# Patient Record
Sex: MALE | Race: WHITE | NOT HISPANIC OR LATINO | Employment: FULL TIME | URBAN - METROPOLITAN AREA
[De-identification: names, ages, dates, MRNs, and addresses within clinical notes are randomized per-mention and may not be internally consistent; named-entity substitution may affect disease eponyms.]

---

## 2023-10-05 PROBLEM — E87.6 HYPOKALEMIA DUE TO EXCESSIVE GASTROINTESTINAL LOSS OF POTASSIUM: Status: ACTIVE | Noted: 2023-10-05

## 2023-10-05 PROBLEM — K56.609 SBO (SMALL BOWEL OBSTRUCTION): Status: ACTIVE | Noted: 2023-10-05

## 2023-10-05 PROBLEM — K21.9 GASTROESOPHAGEAL REFLUX DISEASE WITHOUT ESOPHAGITIS: Chronic | Status: ACTIVE | Noted: 2023-10-05

## 2023-10-05 PROBLEM — I10 PRIMARY HYPERTENSION: Chronic | Status: ACTIVE | Noted: 2023-10-05

## 2023-10-08 PROBLEM — E87.6 HYPOKALEMIA DUE TO EXCESSIVE GASTROINTESTINAL LOSS OF POTASSIUM: Status: RESOLVED | Noted: 2023-10-05 | Resolved: 2023-10-08

## 2024-03-21 ENCOUNTER — HOSPITAL ENCOUNTER (EMERGENCY)
Facility: HOSPITAL | Age: 49
Discharge: HOME | End: 2024-03-21
Attending: STUDENT IN AN ORGANIZED HEALTH CARE EDUCATION/TRAINING PROGRAM

## 2024-03-21 VITALS
DIASTOLIC BLOOD PRESSURE: 74 MMHG | HEIGHT: 69 IN | WEIGHT: 206.56 LBS | HEART RATE: 60 BPM | TEMPERATURE: 98 F | OXYGEN SATURATION: 98 % | BODY MASS INDEX: 30.59 KG/M2 | SYSTOLIC BLOOD PRESSURE: 115 MMHG | RESPIRATION RATE: 18 BRPM

## 2024-03-21 DIAGNOSIS — R07.9 ACUTE CHEST PAIN: Primary | ICD-10-CM

## 2024-03-21 LAB
ALBUMIN SERPL BCP-MCNC: 3.7 G/DL (ref 3.5–5.2)
ALP SERPL-CCNC: 61 U/L (ref 55–135)
ALT SERPL W/O P-5'-P-CCNC: 35 U/L (ref 10–44)
ANION GAP SERPL CALC-SCNC: 9 MMOL/L (ref 8–16)
AST SERPL-CCNC: 28 U/L (ref 10–40)
BASOPHILS # BLD AUTO: 0.04 K/UL (ref 0–0.2)
BASOPHILS NFR BLD: 0.5 % (ref 0–1.9)
BILIRUB SERPL-MCNC: 0.3 MG/DL (ref 0.1–1)
BNP SERPL-MCNC: 10 PG/ML (ref 0–99)
BUN SERPL-MCNC: 15 MG/DL (ref 6–20)
CALCIUM SERPL-MCNC: 9.7 MG/DL (ref 8.7–10.5)
CHLORIDE SERPL-SCNC: 103 MMOL/L (ref 95–110)
CO2 SERPL-SCNC: 25 MMOL/L (ref 23–29)
CREAT SERPL-MCNC: 1 MG/DL (ref 0.5–1.4)
D DIMER PPP IA.FEU-MCNC: 0.23 MG/L FEU
DIFFERENTIAL METHOD BLD: NORMAL
EOSINOPHIL # BLD AUTO: 0.1 K/UL (ref 0–0.5)
EOSINOPHIL NFR BLD: 1.6 % (ref 0–8)
ERYTHROCYTE [DISTWIDTH] IN BLOOD BY AUTOMATED COUNT: 12.9 % (ref 11.5–14.5)
EST. GFR  (NO RACE VARIABLE): >60 ML/MIN/1.73 M^2
GLUCOSE SERPL-MCNC: 105 MG/DL (ref 70–110)
HCT VFR BLD AUTO: 43.8 % (ref 40–54)
HGB BLD-MCNC: 15.1 G/DL (ref 14–18)
IMM GRANULOCYTES # BLD AUTO: 0.02 K/UL (ref 0–0.04)
IMM GRANULOCYTES NFR BLD AUTO: 0.2 % (ref 0–0.5)
LYMPHOCYTES # BLD AUTO: 3.8 K/UL (ref 1–4.8)
LYMPHOCYTES NFR BLD: 43.9 % (ref 18–48)
MCH RBC QN AUTO: 29 PG (ref 27–31)
MCHC RBC AUTO-ENTMCNC: 34.5 G/DL (ref 32–36)
MCV RBC AUTO: 84 FL (ref 82–98)
MONOCYTES # BLD AUTO: 0.6 K/UL (ref 0.3–1)
MONOCYTES NFR BLD: 7.1 % (ref 4–15)
NEUTROPHILS # BLD AUTO: 4 K/UL (ref 1.8–7.7)
NEUTROPHILS NFR BLD: 46.7 % (ref 38–73)
NRBC BLD-RTO: 0 /100 WBC
PLATELET # BLD AUTO: 259 K/UL (ref 150–450)
PMV BLD AUTO: 9.8 FL (ref 9.2–12.9)
POTASSIUM SERPL-SCNC: 4.3 MMOL/L (ref 3.5–5.1)
PROT SERPL-MCNC: 7.2 G/DL (ref 6–8.4)
RBC # BLD AUTO: 5.2 M/UL (ref 4.6–6.2)
SODIUM SERPL-SCNC: 137 MMOL/L (ref 136–145)
TROPONIN I SERPL DL<=0.01 NG/ML-MCNC: 0.01 NG/ML (ref 0–0.03)
TROPONIN I SERPL DL<=0.01 NG/ML-MCNC: <0.006 NG/ML (ref 0–0.03)
WBC # BLD AUTO: 8.65 K/UL (ref 3.9–12.7)

## 2024-03-21 PROCEDURE — 84484 ASSAY OF TROPONIN QUANT: CPT | Performed by: EMERGENCY MEDICINE

## 2024-03-21 PROCEDURE — 99285 EMERGENCY DEPT VISIT HI MDM: CPT | Mod: 25

## 2024-03-21 PROCEDURE — 99900035 HC TECH TIME PER 15 MIN (STAT)

## 2024-03-21 PROCEDURE — 93005 ELECTROCARDIOGRAM TRACING: CPT

## 2024-03-21 PROCEDURE — 85379 FIBRIN DEGRADATION QUANT: CPT | Performed by: STUDENT IN AN ORGANIZED HEALTH CARE EDUCATION/TRAINING PROGRAM

## 2024-03-21 PROCEDURE — 80053 COMPREHEN METABOLIC PANEL: CPT | Performed by: STUDENT IN AN ORGANIZED HEALTH CARE EDUCATION/TRAINING PROGRAM

## 2024-03-21 PROCEDURE — 25500020 PHARM REV CODE 255: Performed by: STUDENT IN AN ORGANIZED HEALTH CARE EDUCATION/TRAINING PROGRAM

## 2024-03-21 PROCEDURE — 93010 ELECTROCARDIOGRAM REPORT: CPT | Mod: ,,, | Performed by: INTERNAL MEDICINE

## 2024-03-21 PROCEDURE — 25000003 PHARM REV CODE 250: Performed by: STUDENT IN AN ORGANIZED HEALTH CARE EDUCATION/TRAINING PROGRAM

## 2024-03-21 PROCEDURE — 85025 COMPLETE CBC W/AUTO DIFF WBC: CPT | Performed by: STUDENT IN AN ORGANIZED HEALTH CARE EDUCATION/TRAINING PROGRAM

## 2024-03-21 PROCEDURE — 84484 ASSAY OF TROPONIN QUANT: CPT | Mod: 91 | Performed by: STUDENT IN AN ORGANIZED HEALTH CARE EDUCATION/TRAINING PROGRAM

## 2024-03-21 PROCEDURE — 83880 ASSAY OF NATRIURETIC PEPTIDE: CPT | Performed by: STUDENT IN AN ORGANIZED HEALTH CARE EDUCATION/TRAINING PROGRAM

## 2024-03-21 RX ORDER — ASPIRIN 325 MG
325 TABLET ORAL
Status: COMPLETED | OUTPATIENT
Start: 2024-03-21 | End: 2024-03-21

## 2024-03-21 RX ADMIN — IOHEXOL 100 ML: 350 INJECTION, SOLUTION INTRAVENOUS at 09:03

## 2024-03-21 RX ADMIN — ASPIRIN 325 MG ORAL TABLET 325 MG: 325 PILL ORAL at 05:03

## 2024-03-21 NOTE — ED NOTES
Pt to Ed for c/o chestpain for the past month. States he couldn't tolerate it anymore. States he does have a appt with his cardiologist in April.

## 2024-03-21 NOTE — ED PROVIDER NOTES
Encounter Date: 3/21/2024    This document was partially completed using speech recognition software and may contain misspellings, grammatical errors, and/or unexpected word substitutions.       History     Chief Complaint   Patient presents with    Chest Pain     Pt arrives to the ed with c/o left anterior chest pains radiates into back x 1 month that is worsening.      48-year-old male with a history of high cholesterol, hypertension, GERD presents to the emergency department left-sided sharp chest pain for the past month.  It is described as a constant pain, worse with movement and raising his right arm.  Not better with anything in particular.  Radiates to the back.  He has been seen by [facility] Dr. Escalante for routine cardiac evaluation. He has no history of stents, heart attacks.  Mother has a history of heart failure. Sees cardiology in April. Doesn't smoke.        Review of patient's allergies indicates:  No Known Allergies  Past Medical History:   Diagnosis Date    Essential (primary) hypertension     GERD (gastroesophageal reflux disease)     Hypercholesteremia      Past Surgical History:   Procedure Laterality Date    CHOLECYSTECTOMY      HERNIA REPAIR      SINUS SURGERY      uvula removal      VASECTOMY       No family history on file.     Review of Systems   Constitutional:  Negative for chills and fever.   HENT:  Negative for congestion, rhinorrhea and sneezing.    Eyes:  Negative for discharge and redness.   Respiratory:  Negative for cough and shortness of breath.    Cardiovascular:  Positive for chest pain. Negative for palpitations.   Gastrointestinal:  Negative for abdominal pain, diarrhea and vomiting.   Genitourinary:  Negative for difficulty urinating, flank pain and urgency.   Musculoskeletal:  Negative for back pain and neck pain.   Skin:  Negative for rash and wound.   Neurological:  Negative for weakness, numbness and headaches.       Physical Exam     Initial Vitals [03/21/24 1609]   BP Pulse  Resp Temp SpO2   139/86 69 20 98.8 °F (37.1 °C) --      MAP       --         Physical Exam    Nursing note and vitals reviewed.  Constitutional: He appears well-developed. He is not diaphoretic. No distress.   HENT:   Head: Normocephalic and atraumatic.   Right Ear: External ear normal.   Left Ear: External ear normal.   Nose: Nose normal.   Eyes: Conjunctivae are normal. Right eye exhibits no discharge. Left eye exhibits no discharge. No scleral icterus.   Cardiovascular:  Normal rate and regular rhythm.           Pulmonary/Chest: Breath sounds normal. No stridor. No respiratory distress. He has no wheezes. He has no rhonchi. He has no rales. He exhibits no tenderness.   Abdominal: Abdomen is soft. He exhibits no distension. There is no abdominal tenderness. There is no guarding.   Musculoskeletal:         General: No edema.     Neurological: He is alert and oriented to person, place, and time. GCS score is 15. GCS eye subscore is 4. GCS verbal subscore is 5. GCS motor subscore is 6.   Skin: Skin is warm and dry. Capillary refill takes less than 2 seconds.   Psychiatric: He has a normal mood and affect.         ED Course   Procedures  Labs Reviewed   CBC W/ AUTO DIFFERENTIAL   COMPREHENSIVE METABOLIC PANEL   TROPONIN I   B-TYPE NATRIURETIC PEPTIDE   D DIMER, QUANTITATIVE     EKG Readings: (Independently Interpreted)   NSR at 66 bpm. Normal axis. Normal intervals. No STEMI. No prior ECG for comparison.       Imaging Results              X-Ray Chest AP Portable (Final result)  Result time 03/21/24 16:50:10      Final result by Álvaro Ellis Jr., MD (03/21/24 16:50:10)                   Impression:      No acute abnormality.      Electronically signed by: Álvaro Ellis MD  Date:    03/21/2024  Time:    16:50               Narrative:    EXAMINATION:  XR CHEST AP PORTABLE    CLINICAL HISTORY:  Chest pain, unspecified    TECHNIQUE:  Single frontal view of the chest was  performed.    COMPARISON:  None    FINDINGS:  The lungs are clear, with normal appearance of pulmonary vasculature and no pleural effusion or pneumothorax.    The cardiac silhouette is normal in size. The hilar and mediastinal contours are unremarkable.    Bones are intact.                                       Medications   aspirin tablet 325 mg (325 mg Oral Given 3/21/24 8812)     Medical Decision Making  Differential considerations include (in particular order): ACS, Pneumonia, Pneumothorax, PE, Aortic dissection, Pericarditis, Zoster rash, Myocarditis    48 year old male with 1 month of chest pain. CXR, ECG negative. Pending CBC, CMP, Trop, BNP, D-dimer. ASA 325mg given.     Management and dispo to be signed out to Dr. Dee at 6pm shift change.    Amount and/or Complexity of Data Reviewed  Labs: ordered.  Radiology: ordered.    Risk  OTC drugs.                                  PERC Rule for Pulmonary Embolism    y Age > 50?   n HR > 100?   n O2 sat on room air <95%?   n Unilateral leg swelling?   n Hemoptysis?   n Recent surgery or trauma?  Surgery or trauma < 4 weeks ago requiring treatment with general anesthesia   n Prior PE or DVT?   n Hormone Use?  Oral contraceptives, hormone replacement or estrogenic hormones use in male or female patients   If any criteria are positive, the PERC rule cannot be used to rule out PE in this patient.             Clinical Impression:  Final diagnoses:  [R07.9] Acute chest pain (Primary)                 Carlos Roberts DO  03/21/24 9212

## 2024-03-22 LAB
OHS QRS DURATION: 84 MS
OHS QTC CALCULATION: 402 MS

## 2024-03-22 NOTE — PROVIDER PROGRESS NOTES - EMERGENCY DEPT.
Encounter Date: 3/21/2024    ED Physician Progress Notes        Physician Note:   10:37 p.m.    Care taken over from Dr. Ramirez, I independently examined and interviewed patient.  48-year-old male with complaints of constant left-sided chest pain for a month or more, worse with certain movements.    Denies any associated shortness of breath, cough, fever, or any other symptoms.    Patient says he has been working in his garden and physically very active and pain does not get worse with any exertion.  Patient's exam is essentially unremarkable.  Patient's workup done including EKG, 2 troponins, CT of the chest, so far no acute findings.    I had extensive discussion with patient and his wife, we will discharge patient home, he will follow up with his cardiologist's and gastroenterologist along with his primary care doctor.  He is aware if any worsening symptoms he will return to ER promptly.

## 2025-05-17 ENCOUNTER — HOSPITAL ENCOUNTER (EMERGENCY)
Facility: HOSPITAL | Age: 50
Discharge: HOME | End: 2025-05-17
Attending: SURGERY
Payer: COMMERCIAL

## 2025-05-17 VITALS
DIASTOLIC BLOOD PRESSURE: 78 MMHG | OXYGEN SATURATION: 97 % | HEART RATE: 91 BPM | BODY MASS INDEX: 32.46 KG/M2 | TEMPERATURE: 98 F | HEIGHT: 67 IN | SYSTOLIC BLOOD PRESSURE: 137 MMHG | RESPIRATION RATE: 18 BRPM | WEIGHT: 206.81 LBS

## 2025-05-17 DIAGNOSIS — S01.01XA SCALP LACERATION, INITIAL ENCOUNTER: Primary | ICD-10-CM

## 2025-05-17 DIAGNOSIS — S40.212A ABRASION OF LEFT SHOULDER, INITIAL ENCOUNTER: ICD-10-CM

## 2025-05-17 PROCEDURE — 90471 IMMUNIZATION ADMIN: CPT | Performed by: SURGERY

## 2025-05-17 PROCEDURE — 90715 TDAP VACCINE 7 YRS/> IM: CPT | Performed by: SURGERY

## 2025-05-17 PROCEDURE — 63600175 PHARM REV CODE 636 W HCPCS: Performed by: SURGERY

## 2025-05-17 PROCEDURE — 25000003 PHARM REV CODE 250: Performed by: SURGERY

## 2025-05-17 PROCEDURE — 99284 EMERGENCY DEPT VISIT MOD MDM: CPT | Mod: 25

## 2025-05-17 PROCEDURE — 96372 THER/PROPH/DIAG INJ SC/IM: CPT | Performed by: SURGERY

## 2025-05-17 PROCEDURE — 12002 RPR S/N/AX/GEN/TRNK2.6-7.5CM: CPT

## 2025-05-17 RX ORDER — LIDOCAINE HYDROCHLORIDE 10 MG/ML
10 INJECTION, SOLUTION EPIDURAL; INFILTRATION; INTRACAUDAL; PERINEURAL
Status: COMPLETED | OUTPATIENT
Start: 2025-05-17 | End: 2025-05-17

## 2025-05-17 RX ORDER — CEPHALEXIN 500 MG/1
500 CAPSULE ORAL EVERY 8 HOURS
Qty: 9 CAPSULE | Refills: 0 | Status: SHIPPED | OUTPATIENT
Start: 2025-05-17 | End: 2025-05-20

## 2025-05-17 RX ORDER — IBUPROFEN 600 MG/1
600 TABLET, FILM COATED ORAL EVERY 8 HOURS PRN
Qty: 20 TABLET | Refills: 0 | Status: SHIPPED | OUTPATIENT
Start: 2025-05-17

## 2025-05-17 RX ORDER — IBUPROFEN 600 MG/1
600 TABLET, FILM COATED ORAL
Status: COMPLETED | OUTPATIENT
Start: 2025-05-17 | End: 2025-05-17

## 2025-05-17 RX ORDER — CEPHALEXIN 500 MG/1
500 CAPSULE ORAL
Status: COMPLETED | OUTPATIENT
Start: 2025-05-17 | End: 2025-05-17

## 2025-05-17 RX ORDER — HYDROCODONE BITARTRATE AND ACETAMINOPHEN 5; 325 MG/1; MG/1
1 TABLET ORAL EVERY 8 HOURS PRN
Qty: 9 TABLET | Refills: 0 | Status: SHIPPED | OUTPATIENT
Start: 2025-05-17 | End: 2025-05-20

## 2025-05-17 RX ADMIN — CEPHALEXIN 500 MG: 500 CAPSULE ORAL at 02:05

## 2025-05-17 RX ADMIN — CLOSTRIDIUM TETANI TOXOID ANTIGEN (FORMALDEHYDE INACTIVATED), CORYNEBACTERIUM DIPHTHERIAE TOXOID ANTIGEN (FORMALDEHYDE INACTIVATED), BORDETELLA PERTUSSIS TOXOID ANTIGEN (GLUTARALDEHYDE INACTIVATED), BORDETELLA PERTUSSIS FILAMENTOUS HEMAGGLUTININ ANTIGEN (FORMALDEHYDE INACTIVATED), BORDETELLA PERTUSSIS PERTACTIN ANTIGEN, AND BORDETELLA PERTUSSIS FIMBRIAE 2/3 ANTIGEN 0.5 ML: 5; 2; 2.5; 5; 3; 5 INJECTION, SUSPENSION INTRAMUSCULAR at 02:05

## 2025-05-17 RX ADMIN — IBUPROFEN 600 MG: 600 TABLET ORAL at 02:05

## 2025-05-17 RX ADMIN — LIDOCAINE HYDROCHLORIDE 100 MG: 10 INJECTION, SOLUTION EPIDURAL; INFILTRATION; INTRACAUDAL at 03:05

## 2025-05-17 RX ADMIN — BACITRACIN ZINC, NEOMYCIN, POLYMYXIN B 1 EACH: 400; 3.5; 5 OINTMENT TOPICAL at 03:05

## 2025-05-17 NOTE — ED TRIAGE NOTES
Laceration to frontal scalp approximately 2 inches in length. Patient reports a 2 x 4 fell off of a scaffold approximately 16 ft onto his head and left shoulder. Bruising to left shoulder. Denies loc.

## 2025-05-17 NOTE — ED PROVIDER NOTES
Encounter Date: 5/17/2025       History     Chief Complaint   Patient presents with    Laceration    Shoulder Pain     Patient 50-year-old man who a piece of wood fall on his scalp and brushed his posterior left shoulder his scalp laceration bled but is not bleeding now he did not lose consciousness he has full range of motion in his left shoulder he is not on any anticoagulants      Laceration   The incident occurred just prior to arrival. The laceration is located on the Scalp. The laceration is 5 cm in size. The laceration mechanism was a a metal edge. The pain is at a severity of 5/10. The pain has been Constant since onset. His tetanus status is out of date.   Shoulder Pain      Review of patient's allergies indicates:  No Known Allergies  Past Medical History:   Diagnosis Date    Essential (primary) hypertension     GERD (gastroesophageal reflux disease)     Hypercholesteremia      Past Surgical History:   Procedure Laterality Date    CHOLECYSTECTOMY      HERNIA REPAIR      SINUS SURGERY      uvula removal      VASECTOMY       No family history on file.  Social History[1]  Review of Systems   Constitutional: Negative.  Negative for activity change, appetite change, chills, diaphoresis, fatigue, fever and unexpected weight change.   HENT: Negative.  Negative for congestion.    Eyes: Negative.    Respiratory: Negative.     Cardiovascular: Negative.    Gastrointestinal: Negative.    Endocrine: Negative.    Genitourinary: Negative.    Musculoskeletal: Negative.    Skin:  Positive for wound.   Allergic/Immunologic: Negative.    Neurological: Negative.    Psychiatric/Behavioral: Negative.         Physical Exam     Initial Vitals [05/17/25 1443]   BP Pulse Resp Temp SpO2   137/78 91 18 98.3 °F (36.8 °C) 97 %      MAP       --         Physical Exam    Nursing note and vitals reviewed.  Constitutional: He appears well-developed and well-nourished.   HENT:   6 cm transverse laceration across the mid vertex across the  midline superficial Galea not exposed           ED Course   Procedures  Labs Reviewed - No data to display       Imaging Results    None          Medications   LIDOcaine (PF) 10 mg/ml (1%) injection 100 mg (100 mg Infiltration Given 5/17/25 1503)   ibuprofen tablet 600 mg (600 mg Oral Given 5/17/25 1457)   Tdap vaccine injection 0.5 mL (0.5 mLs Intramuscular Given 5/17/25 1458)   cephALEXin capsule 500 mg (500 mg Oral Given 5/17/25 1458)     Medical Decision Making  Patient with laceration on scalp from board hitting his head no loss of conscious laceration repaired with 8 staples he has not abrasions left posterior shoulder with full range of motion    Risk  Prescription drug management.                                      Clinical Impression:  Final diagnoses:  [S01.01XA] Scalp laceration, initial encounter (Primary)  [S40.212A] Abrasion of left shoulder, initial encounter          ED Disposition Condition    Discharge Stable          ED Prescriptions       Medication Sig Dispense Start Date End Date Auth. Provider    cephALEXin (KEFLEX) 500 MG capsule Take 1 capsule (500 mg total) by mouth every 8 (eight) hours. for 3 days 9 capsule 5/17/2025 5/20/2025 MYRON Schwartz III, MD    HYDROcodone-acetaminophen (NORCO) 5-325 mg per tablet Take 1 tablet by mouth every 8 (eight) hours as needed. 9 tablet 5/17/2025 5/20/2025 MYRON Schwartz III, MD    ibuprofen (ADVIL,MOTRIN) 600 MG tablet Take 1 tablet (600 mg total) by mouth every 8 (eight) hours as needed for Pain. 20 tablet 5/17/2025 -- MYRON Schwartz III, MD          Follow-up Information    None              [1]   Social History  Tobacco Use    Smoking status: Never    Smokeless tobacco: Never   Substance Use Topics    Alcohol use: Yes        MYRON Schwartz III, MD  05/17/25 1037